# Patient Record
Sex: MALE | Race: WHITE | NOT HISPANIC OR LATINO
[De-identification: names, ages, dates, MRNs, and addresses within clinical notes are randomized per-mention and may not be internally consistent; named-entity substitution may affect disease eponyms.]

---

## 2020-06-28 ENCOUNTER — TRANSCRIPTION ENCOUNTER (OUTPATIENT)
Age: 55
End: 2020-06-28

## 2021-10-27 DIAGNOSIS — Z83.71 FAMILY HISTORY OF COLONIC POLYPS: ICD-10-CM

## 2021-10-27 DIAGNOSIS — Z82.49 FAMILY HISTORY OF ISCHEMIC HEART DISEASE AND OTHER DISEASES OF THE CIRCULATORY SYSTEM: ICD-10-CM

## 2021-10-27 DIAGNOSIS — Z87.01 PERSONAL HISTORY OF PNEUMONIA (RECURRENT): ICD-10-CM

## 2021-10-27 DIAGNOSIS — K21.00 GASTRO-ESOPHAGEAL REFLUX DISEASE WITH ESOPHAGITIS, WITHOUT BLEEDING: ICD-10-CM

## 2021-10-27 DIAGNOSIS — Z82.69 FAMILY HISTORY OF OTHER DISEASES OF THE MUSCULOSKELETAL SYSTEM AND CONNECTIVE TISSUE: ICD-10-CM

## 2021-10-27 DIAGNOSIS — Z82.5 FAMILY HISTORY OF ASTHMA AND OTHER CHRONIC LOWER RESPIRATORY DISEASES: ICD-10-CM

## 2021-10-27 DIAGNOSIS — Z87.09 PERSONAL HISTORY OF OTHER DISEASES OF THE RESPIRATORY SYSTEM: ICD-10-CM

## 2021-10-27 DIAGNOSIS — Z80.6 FAMILY HISTORY OF LEUKEMIA: ICD-10-CM

## 2021-10-27 DIAGNOSIS — Z86.79 PERSONAL HISTORY OF OTHER DISEASES OF THE CIRCULATORY SYSTEM: ICD-10-CM

## 2021-10-27 DIAGNOSIS — Z72.89 OTHER PROBLEMS RELATED TO LIFESTYLE: ICD-10-CM

## 2021-10-27 DIAGNOSIS — Z80.7 FAMILY HISTORY OF OTHER MALIGNANT NEOPLASMS OF LYMPHOID, HEMATOPOIETIC AND RELATED TISSUES: ICD-10-CM

## 2021-10-27 PROBLEM — Z00.00 ENCOUNTER FOR PREVENTIVE HEALTH EXAMINATION: Status: ACTIVE | Noted: 2021-10-27

## 2021-12-07 ENCOUNTER — APPOINTMENT (OUTPATIENT)
Dept: INTERNAL MEDICINE | Facility: CLINIC | Age: 56
End: 2021-12-07
Payer: COMMERCIAL

## 2021-12-07 ENCOUNTER — TRANSCRIPTION ENCOUNTER (OUTPATIENT)
Age: 56
End: 2021-12-07

## 2021-12-07 ENCOUNTER — NON-APPOINTMENT (OUTPATIENT)
Age: 56
End: 2021-12-07

## 2021-12-07 VITALS
DIASTOLIC BLOOD PRESSURE: 74 MMHG | SYSTOLIC BLOOD PRESSURE: 118 MMHG | BODY MASS INDEX: 29.03 KG/M2 | TEMPERATURE: 98.6 F | HEIGHT: 69 IN | HEART RATE: 67 BPM | RESPIRATION RATE: 16 BRPM | OXYGEN SATURATION: 95 % | WEIGHT: 196 LBS

## 2021-12-07 LAB
BILIRUB UR QL STRIP: NEGATIVE
CLARITY UR: CLEAR
COLLECTION METHOD: NORMAL
GLUCOSE UR-MCNC: NEGATIVE
HCG UR QL: 0.2 EU/DL
HGB UR QL STRIP.AUTO: NEGATIVE
KETONES UR-MCNC: NEGATIVE
LEUKOCYTE ESTERASE UR QL STRIP: NEGATIVE
NITRITE UR QL STRIP: NEGATIVE
PH UR STRIP: 5.5
PROT UR STRIP-MCNC: NEGATIVE
SP GR UR STRIP: 1.03

## 2021-12-07 PROCEDURE — 36415 COLL VENOUS BLD VENIPUNCTURE: CPT

## 2021-12-07 PROCEDURE — G0444 DEPRESSION SCREEN ANNUAL: CPT | Mod: 59

## 2021-12-07 PROCEDURE — 93000 ELECTROCARDIOGRAM COMPLETE: CPT | Mod: 59

## 2021-12-07 PROCEDURE — G0442 ANNUAL ALCOHOL SCREEN 15 MIN: CPT

## 2021-12-07 PROCEDURE — 99396 PREV VISIT EST AGE 40-64: CPT | Mod: 25

## 2021-12-07 PROCEDURE — 81003 URINALYSIS AUTO W/O SCOPE: CPT | Mod: NC,QW

## 2021-12-07 RX ORDER — PANTOPRAZOLE 40 MG/1
40 TABLET, DELAYED RELEASE ORAL DAILY
Refills: 0 | Status: DISCONTINUED | COMMUNITY
End: 2021-12-07

## 2021-12-07 RX ORDER — ASPIRIN 81 MG/1
81 TABLET, CHEWABLE ORAL DAILY
Refills: 0 | Status: DISCONTINUED | COMMUNITY
End: 2021-12-07

## 2021-12-07 NOTE — HISTORY OF PRESENT ILLNESS
[de-identified] : Had RF ablation to the veins on the right thigh. 12/15/2020.\par No other complaints and no changes in functional ability.\par

## 2021-12-07 NOTE — HEALTH RISK ASSESSMENT
[Yes] : Yes [2 - 3 times a week (3 pts)] : 2 - 3  times a week (3 points) [1 or 2 (0 pts)] : 1 or 2 (0 points) [Never (0 pts)] : Never (0 points) [No] : In the past 12 months have you used drugs other than those required for medical reasons? No [No falls in past year] : Patient reported no falls in the past year [0] : 2) Feeling down, depressed, or hopeless: Not at all (0) [PHQ-2 Negative - No further assessment needed] : PHQ-2 Negative - No further assessment needed [Fully functional (bathing, dressing, toileting, transferring, walking, feeding)] : Fully functional (bathing, dressing, toileting, transferring, walking, feeding) [Fully functional (using the telephone, shopping, preparing meals, housekeeping, doing laundry, using] : Fully functional and needs no help or supervision to perform IADLs (using the telephone, shopping, preparing meals, housekeeping, doing laundry, using transportation, managing medications and managing finances) [I will adhere to the patient's wishes.] : I will adhere to the patient's wishes. [Time Spent: ___ minutes] : Time Spent: [unfilled] minutes [] : No [[id number]] : 0 [AdvancecareDate] : 12/7/21 [FreeTextEntry4] : Discussed with patient.  Suggested the discussion with the healthcare proxy about quality of life issues.  Suggested a discussion on withholding treatment and no further therapy in any given circumstance as determined by the patient.\par

## 2021-12-07 NOTE — PHYSICAL EXAM
[Normal] : normal gait, coordination grossly intact, no focal deficits [FreeTextEntry1] : not enlarged, no nodules.

## 2021-12-08 ENCOUNTER — TRANSCRIPTION ENCOUNTER (OUTPATIENT)
Age: 56
End: 2021-12-08

## 2021-12-08 LAB
ALBUMIN SERPL ELPH-MCNC: 4.5 G/DL
ALP BLD-CCNC: 77 U/L
ALT SERPL-CCNC: 21 U/L
ANION GAP SERPL CALC-SCNC: 12 MMOL/L
AST SERPL-CCNC: 20 U/L
BASOPHILS # BLD AUTO: 0.05 K/UL
BASOPHILS NFR BLD AUTO: 0.9 %
BILIRUB SERPL-MCNC: 0.4 MG/DL
BUN SERPL-MCNC: 22 MG/DL
CALCIUM SERPL-MCNC: 9.4 MG/DL
CHLORIDE SERPL-SCNC: 104 MMOL/L
CHOLEST SERPL-MCNC: 200 MG/DL
CO2 SERPL-SCNC: 26 MMOL/L
CREAT SERPL-MCNC: 0.96 MG/DL
EOSINOPHIL # BLD AUTO: 0.21 K/UL
EOSINOPHIL NFR BLD AUTO: 3.8 %
ESTIMATED AVERAGE GLUCOSE: 120 MG/DL
GLUCOSE SERPL-MCNC: 92 MG/DL
HBA1C MFR BLD HPLC: 5.8 %
HCT VFR BLD CALC: 43.3 %
HDLC SERPL-MCNC: 43 MG/DL
HGB BLD-MCNC: 14.6 G/DL
IMM GRANULOCYTES NFR BLD AUTO: 0.2 %
LDLC SERPL CALC-MCNC: 112 MG/DL
LYMPHOCYTES # BLD AUTO: 1.61 K/UL
LYMPHOCYTES NFR BLD AUTO: 29 %
MAN DIFF?: NORMAL
MCHC RBC-ENTMCNC: 28.1 PG
MCHC RBC-ENTMCNC: 33.7 GM/DL
MCV RBC AUTO: 83.3 FL
MONOCYTES # BLD AUTO: 0.34 K/UL
MONOCYTES NFR BLD AUTO: 6.1 %
NEUTROPHILS # BLD AUTO: 3.33 K/UL
NEUTROPHILS NFR BLD AUTO: 60 %
NONHDLC SERPL-MCNC: 157 MG/DL
PLATELET # BLD AUTO: 165 K/UL
POTASSIUM SERPL-SCNC: 4.3 MMOL/L
PROT SERPL-MCNC: 6.9 G/DL
PSA SERPL-MCNC: 0.91 NG/ML
RBC # BLD: 5.2 M/UL
RBC # FLD: 12.2 %
SODIUM SERPL-SCNC: 142 MMOL/L
TRIGL SERPL-MCNC: 223 MG/DL
TSH SERPL-ACNC: 3.59 UIU/ML
WBC # FLD AUTO: 5.55 K/UL

## 2022-02-07 ENCOUNTER — TRANSCRIPTION ENCOUNTER (OUTPATIENT)
Age: 57
End: 2022-02-07

## 2022-05-11 ENCOUNTER — TRANSCRIPTION ENCOUNTER (OUTPATIENT)
Age: 57
End: 2022-05-11

## 2023-03-06 ENCOUNTER — RX RENEWAL (OUTPATIENT)
Age: 58
End: 2023-03-06

## 2023-03-16 DIAGNOSIS — Z01.89 ENCOUNTER FOR OTHER SPECIFIED SPECIAL EXAMINATIONS: ICD-10-CM

## 2023-03-16 DIAGNOSIS — B35.1 TINEA UNGUIUM: ICD-10-CM

## 2023-03-16 DIAGNOSIS — I83.93 ASYMPTOMATIC VARICOSE VEINS OF BILATERAL LOWER EXTREMITIES: ICD-10-CM

## 2023-03-16 DIAGNOSIS — Z82.49 FAMILY HISTORY OF ISCHEMIC HEART DISEASE AND OTHER DISEASES OF THE CIRCULATORY SYSTEM: ICD-10-CM

## 2023-03-16 DIAGNOSIS — J45.990 EXERCISE INDUCED BRONCHOSPASM: ICD-10-CM

## 2023-03-17 ENCOUNTER — APPOINTMENT (OUTPATIENT)
Dept: INTERNAL MEDICINE | Facility: CLINIC | Age: 58
End: 2023-03-17

## 2023-03-21 ENCOUNTER — NON-APPOINTMENT (OUTPATIENT)
Age: 58
End: 2023-03-21

## 2023-03-23 ENCOUNTER — APPOINTMENT (OUTPATIENT)
Dept: INTERNAL MEDICINE | Facility: CLINIC | Age: 58
End: 2023-03-23
Payer: COMMERCIAL

## 2023-03-23 ENCOUNTER — NON-APPOINTMENT (OUTPATIENT)
Age: 58
End: 2023-03-23

## 2023-03-23 VITALS
HEART RATE: 74 BPM | TEMPERATURE: 98.2 F | SYSTOLIC BLOOD PRESSURE: 124 MMHG | DIASTOLIC BLOOD PRESSURE: 78 MMHG | WEIGHT: 194 LBS | RESPIRATION RATE: 16 BRPM | BODY MASS INDEX: 28.73 KG/M2 | HEIGHT: 69 IN | OXYGEN SATURATION: 96 %

## 2023-03-23 DIAGNOSIS — J45.909 UNSPECIFIED ASTHMA, UNCOMPLICATED: ICD-10-CM

## 2023-03-23 DIAGNOSIS — M10.9 GOUT, UNSPECIFIED: ICD-10-CM

## 2023-03-23 PROCEDURE — 36415 COLL VENOUS BLD VENIPUNCTURE: CPT

## 2023-03-23 PROCEDURE — 99396 PREV VISIT EST AGE 40-64: CPT | Mod: 25

## 2023-03-23 PROCEDURE — 90750 HZV VACC RECOMBINANT IM: CPT

## 2023-03-23 PROCEDURE — 99497 ADVNCD CARE PLAN 30 MIN: CPT | Mod: 25

## 2023-03-23 PROCEDURE — 93000 ELECTROCARDIOGRAM COMPLETE: CPT | Mod: 59

## 2023-03-23 PROCEDURE — 90471 IMMUNIZATION ADMIN: CPT

## 2023-03-23 NOTE — HISTORY OF PRESENT ILLNESS
[de-identified] : Patient comes for a physical with no overall change in functional or exertional ability.  No acute complaints.\par Taking medications for gout with no recent gout attacks.\par Not actively exercising so has not had to use the inhaler.

## 2023-03-23 NOTE — HEALTH RISK ASSESSMENT
[2 - 3 times a week (3 pts)] : 2 - 3  times a week (3 points) [1 or 2 (0 pts)] : 1 or 2 (0 points) [No] : In the past 12 months have you used drugs other than those required for medical reasons? No [0] : 2) Feeling down, depressed, or hopeless: Not at all (0) [PHQ-2 Negative - No further assessment needed] : PHQ-2 Negative - No further assessment needed [Never] : Never [Yes] : Yes [Never (0 pts)] : Never (0 points) [No falls in past year] : Patient reported no falls in the past year [HIV test declined] : HIV test declined [Hepatitis C test declined] : Hepatitis C test declined [Fully functional (bathing, dressing, toileting, transferring, walking, feeding)] : Fully functional (bathing, dressing, toileting, transferring, walking, feeding) [Fully functional (using the telephone, shopping, preparing meals, housekeeping, doing laundry, using] : Fully functional and needs no help or supervision to perform IADLs (using the telephone, shopping, preparing meals, housekeeping, doing laundry, using transportation, managing medications and managing finances) [Reviewed no changes] : Reviewed, no changes [Designated Healthcare Proxy] : Designated healthcare proxy [Name: ___] : Health Care Proxy's Name: [unfilled]  [Relationship: ___] : Relationship: [unfilled] [I will adhere to the patient's wishes.] : I will adhere to the patient's wishes. [Time Spent: ___ minutes] : Time Spent: [unfilled] minutes [Audit-CScore] : 3 [[id number]] : 0 [AdvancecareDate] : 3/23/23 [FreeTextEntry4] : Discussed with patient.  Suggested the discussion with the healthcare proxy about quality of life issues.  Suggested a discussion on withholding treatment and no further therapy in any given circumstance as determined by the patient.\par

## 2023-03-24 LAB
ALBUMIN SERPL ELPH-MCNC: 4.6 G/DL
ALP BLD-CCNC: 80 U/L
ALT SERPL-CCNC: 29 U/L
ANION GAP SERPL CALC-SCNC: 14 MMOL/L
AST SERPL-CCNC: 22 U/L
BASOPHILS # BLD AUTO: 0.06 K/UL
BASOPHILS NFR BLD AUTO: 1.2 %
BILIRUB SERPL-MCNC: 1.1 MG/DL
BUN SERPL-MCNC: 24 MG/DL
CALCIUM SERPL-MCNC: 9.9 MG/DL
CHLORIDE SERPL-SCNC: 102 MMOL/L
CHOLEST SERPL-MCNC: 223 MG/DL
CO2 SERPL-SCNC: 25 MMOL/L
CREAT SERPL-MCNC: 0.95 MG/DL
EGFR: 94 ML/MIN/1.73M2
EOSINOPHIL # BLD AUTO: 0.18 K/UL
EOSINOPHIL NFR BLD AUTO: 3.6 %
GLUCOSE SERPL-MCNC: 103 MG/DL
HCT VFR BLD CALC: 45.2 %
HDLC SERPL-MCNC: 48 MG/DL
HGB BLD-MCNC: 15.1 G/DL
IMM GRANULOCYTES NFR BLD AUTO: 0.2 %
LDLC SERPL CALC-MCNC: 140 MG/DL
LYMPHOCYTES # BLD AUTO: 1.81 K/UL
LYMPHOCYTES NFR BLD AUTO: 36.1 %
MAN DIFF?: NORMAL
MCHC RBC-ENTMCNC: 27.8 PG
MCHC RBC-ENTMCNC: 33.4 GM/DL
MCV RBC AUTO: 83.1 FL
MONOCYTES # BLD AUTO: 0.37 K/UL
MONOCYTES NFR BLD AUTO: 7.4 %
NEUTROPHILS # BLD AUTO: 2.59 K/UL
NEUTROPHILS NFR BLD AUTO: 51.5 %
NONHDLC SERPL-MCNC: 175 MG/DL
PLATELET # BLD AUTO: 177 K/UL
POTASSIUM SERPL-SCNC: 4.5 MMOL/L
PROT SERPL-MCNC: 7 G/DL
PSA SERPL-MCNC: 0.67 NG/ML
RBC # BLD: 5.44 M/UL
RBC # FLD: 13.1 %
SODIUM SERPL-SCNC: 142 MMOL/L
TRIGL SERPL-MCNC: 174 MG/DL
TSH SERPL-ACNC: 2.79 UIU/ML
URATE SERPL-MCNC: 6 MG/DL
WBC # FLD AUTO: 5.02 K/UL

## 2023-05-23 ENCOUNTER — APPOINTMENT (OUTPATIENT)
Dept: INTERNAL MEDICINE | Facility: CLINIC | Age: 58
End: 2023-05-23
Payer: COMMERCIAL

## 2023-05-23 ENCOUNTER — NON-APPOINTMENT (OUTPATIENT)
Age: 58
End: 2023-05-23

## 2023-05-23 PROCEDURE — 90471 IMMUNIZATION ADMIN: CPT

## 2023-05-23 PROCEDURE — 90750 HZV VACC RECOMBINANT IM: CPT

## 2023-05-23 RX ORDER — ATORVASTATIN CALCIUM 10 MG/1
10 TABLET, FILM COATED ORAL DAILY
Qty: 90 | Refills: 0 | Status: ACTIVE | COMMUNITY
Start: 2023-05-23 | End: 1900-01-01

## 2023-06-05 ENCOUNTER — RX RENEWAL (OUTPATIENT)
Age: 58
End: 2023-06-05

## 2023-06-08 ENCOUNTER — APPOINTMENT (OUTPATIENT)
Dept: VASCULAR SURGERY | Facility: CLINIC | Age: 58
End: 2023-06-08
Payer: COMMERCIAL

## 2023-06-08 VITALS
RESPIRATION RATE: 16 BRPM | DIASTOLIC BLOOD PRESSURE: 82 MMHG | WEIGHT: 191.38 LBS | BODY MASS INDEX: 28.35 KG/M2 | SYSTOLIC BLOOD PRESSURE: 124 MMHG | OXYGEN SATURATION: 99 % | TEMPERATURE: 98 F | HEART RATE: 63 BPM | HEIGHT: 69 IN

## 2023-06-08 PROCEDURE — 99204 OFFICE O/P NEW MOD 45 MIN: CPT

## 2023-06-09 NOTE — ASSESSMENT
[FreeTextEntry1] : 58-year-old male with right lower extremity symptomatic varicose veins and edema. \par \par  Patient underwent endovenous ablation of right greater saphenous vein.  He was advised at that during the time of his intervention stab phlebectomy was supposed to be performed however patient had a vasovagal episode and procedure was aborted.\par \par We discussed potential etiologies as well as treatment options such as endovenous ablation, Varithena, and stab phlebectomy.  Patient understands the risks of intervention, including but not limited to bleeding, infection, permanent nerve damage, distal embolization, EHIT, and skin discoloration. \par Patient will attempt a course of non-operative management with leg compression and elevation.  \par Skin care with moisturizers. \par Follow up in the office in 4-6 weeks or sooner as needed. \par \par 46 min \par

## 2023-06-09 NOTE — HISTORY OF PRESENT ILLNESS
[FreeTextEntry1] : 59 yo M with PNHx of R GSV ablation for symptomatic VVs and reflux at the OSH presented for an evaluation of right  lower extremity edema symptomatic varicose veins.  The symptoms are  chronic, present for years. Progressively worsened over time. Patient complains of tenderness, heaviness, burning and significant discomfort in legs.  The symptoms are least pronounced in the morning and worsens during the course of the day. Alleviated by leg elevation. Exacerbated by prolonged standing, activity and warm weather. Lifestyle limiting. \par Patient attempted compressive therapy in the past with only a marginal improvement of the symptoms since prior to closure of the GSV. \par \par No history of cellulitis, ulcers. \par No erythema. \par \par 12 system ROS is as per HPI, otherwise unremarcable. \par \par PHYSICAL EXAM: \par General: NAD, awake alert and oriented.\par HEENT: Normocephalic atraumatic.  Extraocular muscles intact.\par Neck: No JVD.  Normal thyroid.\par Abdomen: Soft, NT, ND. No guarding. No palpable masses. No HSM.\par : No CVA tenderness.\par Vascular: 2+ bilateral carotid, radial, femoral, popliteal, DP and PT pulses.\par Bilateral lower extremity 2+ edema.\par Varicose veins greater than 3 mm in diameter present in right lower extremity.  Most pronounced on the medial aspect of the calf.  Tender to touch.\par Extremity: Bilateral lower extremity warm and dry.  No cyanosis.  + edema.\par Integument: No rashes or lesions\par MS: Normal muscle bulk and tone. FROM in all extremities.\par Neuro: AAOx3, CN II-XII intact. Strength 5/5 in all 4 extremities. Sensation intact throughout.\par Psych: Calm.  Appropriate affect.\par

## 2023-06-28 ENCOUNTER — APPOINTMENT (OUTPATIENT)
Dept: INTERNAL MEDICINE | Facility: CLINIC | Age: 58
End: 2023-06-28

## 2023-07-13 ENCOUNTER — APPOINTMENT (OUTPATIENT)
Dept: VASCULAR SURGERY | Facility: CLINIC | Age: 58
End: 2023-07-13
Payer: COMMERCIAL

## 2023-07-13 PROCEDURE — 99213 OFFICE O/P EST LOW 20 MIN: CPT

## 2023-07-13 NOTE — DATA REVIEWED
[FreeTextEntry1] : Date: 23\par Patient Info\par Name:     ARABELLA VALENCIA\par Age:     58 years\par :     1965\par Gender:     Male\par MRN:     [id number]\par Accession #:     [id number]\par Ht:     69 in\par Wt:     193 lb\par BSA:     2.08 m2\par Technical Quality:     Diagnostic quality\par Exam Date:     2023 11:22 AM\par Exam Location:     [facility]\par Patient Status:     Outpatient\par \par Staff\par Ordering Physician:     Misty Mendoza MD\par Sonographer:     Yumiko Tran RVT\par \par Exam Type:     CV VENOUS LOW UNI REFLUX\par \par Indications\par      - R60.0\par \par A non-invasive vascular duplex imaging study of the deep and superficial veins\par of the right lower extremity(s) was performed using two-dimensional\par evaluation, color flow and spectral Doppler analysis.\par   An evaluation for venous insufficiency was also completed.\par \par Account #:     [id number]\par \par Medications:     No meds\par \par \par Swelling Location:     right\par \par Swelling:     Yes\par \par Prior Ablation:     Yes\par \par Ablation Date:     2020\par \par \par Summary\par   1. ACUTE superficial venous thrombosis in RIGHT calf varicosities without\par extension into the great saphenous vein.\par   2. No evidence of deep vein thrombosis right lower extremity.\par   3. Right great saphenous vein atrophied proximal thigh to proximal calf\par status post ablation.\par   4. Severe venous insufficiency in the right small saphenous vein.\par \par Results Notification\par   Verbal prelim to Dr. Beasley @ 12:11pm.\par \par \par Right Findings\par   ACUTE superficial venous thrombosis in RIGHT calf varicosities without\par extension into the great saphenous vein.\par   No evidence of deep vein thrombosis right lower extremity.\par   The deep veins of the right lower extremity are readily compressible with\par normal venous flow dynamics including spontaneous flow, respiratory phasic\par variation and augmentation.\par   Right great saphenous vein atrophied proximal thigh to proximal calf status\par post ablation.\par   No evidence of venous insufficiency in the right deep veins or calf great\par saphenous vein.\par   Severe venous insufficiency in the right small saphenous vein.\par \par \par B--Mode Measurements\par ----------------------------------------------------------------------\par Name                                 Value              \par ----------------------------------------------------------------------\par \par Right Superficial Venous System\par ----------------------------------------------------------------------\par Right Mid SSV Reflux Time          5.7 sec\par \par ----------------------------------------------------------------------\par Name                                 Value              \par ----------------------------------------------------------------------\par \par Right\par ----------------------------------------------------------------------\par Right Mid GSV (Calf) AP Diam       0.31 cm               \par Right Prox SSV AP Diam             0.43 cm               \par Right Mid SSV AP Diam              0.27 cm\par \par \par Report Signatures\par Finalized by Dr. Med Estrella MD, [facility], [facility] on 2023 10:14 PM

## 2023-07-13 NOTE — PHYSICAL EXAM
[Normal Breath Sounds] : Normal breath sounds [2+] : left 2+ [Varicose Veins Of Lower Extremities] : present [Varicose Veins Of The Right Leg] : of the right leg [Alert] : alert [Oriented to Person] : oriented to person [Oriented to Place] : oriented to place [Calm] : calm [de-identified] : NAD [de-identified] : NCAT [de-identified] : supple [de-identified] : Soft, nontender, nondistended [de-identified] : Left medial calf with varicose veins, small focus which are noncompressible and mildly tender, and adjacent varicose veins are compressible and soft

## 2023-07-13 NOTE — HISTORY OF PRESENT ILLNESS
[FreeTextEntry1] : 57 yo M with PNHx of R GSV ablation for symptomatic VVs and reflux at the OSH presented for an evaluation of right  lower extremity edema symptomatic varicose veins.  The symptoms are  chronic, present for years. Progressively worsened over time. Patient complains of tenderness, heaviness, burning and significant discomfort in legs.  The symptoms are least pronounced in the morning and worsens during the course of the day. Alleviated by leg elevation. Exacerbated by prolonged standing, activity and warm weather. Lifestyle limiting. \par Patient attempted compressive therapy in the past with only a marginal improvement of the symptoms since prior to closure of the GSV. \par \par No history of cellulitis, ulcers. \par No erythema. \par \par 12 system ROS is as per HPI, otherwise unremarcable. \par \par PHYSICAL EXAM: \par General: NAD, awake alert and oriented.\par HEENT: Normocephalic atraumatic.  Extraocular muscles intact.\par Neck: No JVD.  Normal thyroid.\par Abdomen: Soft, NT, ND. No guarding. No palpable masses. No HSM.\par : No CVA tenderness.\par Vascular: 2+ bilateral carotid, radial, femoral, popliteal, DP and PT pulses.\par Bilateral lower extremity 2+ edema.\par Varicose veins greater than 3 mm in diameter present in right lower extremity.  Most pronounced on the medial aspect of the calf.  Tender to touch.\par Extremity: Bilateral lower extremity warm and dry.  No cyanosis.  + edema.\par Integument: No rashes or lesions\par MS: Normal muscle bulk and tone. FROM in all extremities.\par Neuro: AAOx3, CN II-XII intact. Strength 5/5 in all 4 extremities. Sensation intact throughout.\par Psych: Calm.  Appropriate affect.\par  [de-identified] : 7/13/23 - He returns for follow-up as he was undergoing right leg venous ultrasound at the [place] [facility] lab, and is found to have thrombophlebitis of his varicose veins.  He reports that he developed some pain and discomfort overlying the varicose veins as well as some hardness in the area.  He denies leg swelling or other newfound discomfort.

## 2023-07-13 NOTE — ASSESSMENT
[FreeTextEntry1] : 58-year-old male who has been evaluated by my partner Dr. Mendoza for recurrent right leg symptomatic varicosities, on a background of right leg ablation in the past.  Due to his recurrent symptoms, he was undergoing venous duplex at the [place] [facility] lab which revealed SSV reflux as well as superficial thrombophlebitis of his calf varicosities, there is no axial superficial or deep vein involvement, and as such no anticoagulation is indicated at this time..  I have advised him to utilize warm compresses over the affected area as well as anti-inflammatories for the next 1 to 2 weeks, I have also advised use of compression garments on a daily basis.  He will follow-up with Dr. Mendoza in 3 to 4 weeks to ensure appropriate resolution of his SVT, and then proceed with the appropriate planned venous interventions.

## 2023-08-01 ENCOUNTER — APPOINTMENT (OUTPATIENT)
Dept: VASCULAR SURGERY | Facility: CLINIC | Age: 58
End: 2023-08-01
Payer: COMMERCIAL

## 2023-08-01 VITALS — DIASTOLIC BLOOD PRESSURE: 90 MMHG | SYSTOLIC BLOOD PRESSURE: 137 MMHG | HEART RATE: 66 BPM

## 2023-08-01 DIAGNOSIS — R60.0 LOCALIZED EDEMA: ICD-10-CM

## 2023-08-01 DIAGNOSIS — I80.01 PHLEBITIS AND THROMBOPHLEBITIS OF SUPERFICIAL VESSELS OF RIGHT LOWER EXTREMITY: ICD-10-CM

## 2023-08-01 DIAGNOSIS — I83.899 VARICOSE VEINS OF UNSPECIFIED LOWER EXTREMITY WITH OTHER COMPLICATIONS: ICD-10-CM

## 2023-08-01 PROCEDURE — 99213 OFFICE O/P EST LOW 20 MIN: CPT

## 2023-08-01 NOTE — DATA REVIEWED
[FreeTextEntry1] : venous US reviewed. SSV reflux and large medial calf varicose veins with thrombophlebitis.

## 2023-08-01 NOTE — HISTORY OF PRESENT ILLNESS
[FreeTextEntry1] : 57 yo M with PNHx of R GSV ablation for symptomatic VVs and reflux at the OSH presented for an evaluation of right  lower extremity edema symptomatic varicose veins.  The symptoms are  chronic, present for years. Progressively worsened over time. Patient complains of tenderness, heaviness, burning and significant discomfort in legs.  The symptoms are least pronounced in the morning and worsens during the course of the day. Alleviated by leg elevation. Exacerbated by prolonged standing, activity and warm weather. Lifestyle limiting.  Patient attempted compressive therapy in the past with only a marginal improvement of the symptoms since prior to closure of the GSV.   No history of cellulitis, ulcers.  No erythema.      [de-identified] : 58-year-old male returns for follow-up. The patient was found to have thrombophlebitis of his varicose veins during his last US at the [place] [facility].  He continues have tenderness and discomfort overlying the varicose veins, especially when he is sitting for a prolonged period of time. He reports occasional right lower extremity swelling associated with the veins.  Review of systems: 12 system ROS is negative except for as per HPI.

## 2023-08-01 NOTE — ASSESSMENT
[FreeTextEntry1] : 58-year-old male with recurrent right leg symptomatic varicosities. His last US demonstrated SSV reflux as well as superficial thrombophlebitis of his calf varicosities. He continues to have tenderness and discomfort despite daily compression and using anti-inflammatories. The patient is an excellent candidate for an ablation of the right SSV followed by a stab phlebectomy in a staged fashion. The risks and benefits of both procedures including infection, bleeding, and DVT were discussed in detail with the patient.   The patient would like to proceed with the ablation and possible stab phlebectopmy and will be scheduled for the procedure at his convenience.  [Arterial/Venous Disease] : arterial/venous disease

## 2024-03-26 ENCOUNTER — APPOINTMENT (OUTPATIENT)
Dept: INTERNAL MEDICINE | Facility: CLINIC | Age: 59
End: 2024-03-26

## 2024-05-14 ENCOUNTER — NON-APPOINTMENT (OUTPATIENT)
Age: 59
End: 2024-05-14

## 2024-05-15 ENCOUNTER — NON-APPOINTMENT (OUTPATIENT)
Age: 59
End: 2024-05-15

## 2024-05-15 ENCOUNTER — APPOINTMENT (OUTPATIENT)
Dept: INTERNAL MEDICINE | Facility: CLINIC | Age: 59
End: 2024-05-15
Payer: COMMERCIAL

## 2024-05-15 VITALS
BODY MASS INDEX: 28.14 KG/M2 | TEMPERATURE: 98.4 F | OXYGEN SATURATION: 97 % | SYSTOLIC BLOOD PRESSURE: 142 MMHG | DIASTOLIC BLOOD PRESSURE: 82 MMHG | WEIGHT: 190 LBS | HEIGHT: 69 IN | HEART RATE: 67 BPM | RESPIRATION RATE: 16 BRPM

## 2024-05-15 DIAGNOSIS — E78.5 HYPERLIPIDEMIA, UNSPECIFIED: ICD-10-CM

## 2024-05-15 DIAGNOSIS — Z00.00 ENCOUNTER FOR GENERAL ADULT MEDICAL EXAMINATION W/OUT ABNORMAL FINDINGS: ICD-10-CM

## 2024-05-15 DIAGNOSIS — R07.9 CHEST PAIN, UNSPECIFIED: ICD-10-CM

## 2024-05-15 PROCEDURE — 93000 ELECTROCARDIOGRAM COMPLETE: CPT

## 2024-05-15 PROCEDURE — 99213 OFFICE O/P EST LOW 20 MIN: CPT | Mod: 25

## 2024-05-15 PROCEDURE — 99396 PREV VISIT EST AGE 40-64: CPT

## 2024-05-15 PROCEDURE — 36415 COLL VENOUS BLD VENIPUNCTURE: CPT

## 2024-05-15 NOTE — HISTORY OF PRESENT ILLNESS
[de-identified] : Patient comes to the office for a physical.  For about 4 weeks patient has been having anterior chest discomfort that may be persistent with times when it increases.  No specific etiology of what makes the discomfort increased.  No changes in exertional or functional ability.  No nausea vomiting shortness of breath or difficulty breathing.  No radiation of the pain.  No palpitations except for about a week and a half ago when he had a transient brief flutter. Never started cholesterol medicines.  Continues to take medication for prevention of gout.  No recent flareups.

## 2024-05-15 NOTE — HEALTH RISK ASSESSMENT
[Good] : ~his/her~  mood as  good [Yes] : Yes [2 - 3 times a week (3 pts)] : 2 - 3  times a week (3 points) [1 or 2 (0 pts)] : 1 or 2 (0 points) [Never (0 pts)] : Never (0 points) [No] : In the past 12 months have you used drugs other than those required for medical reasons? No [Little interest or pleasure doing things] : 1) Little interest or pleasure doing things [Feeling down, depressed, or hopeless] : 2) Feeling down, depressed, or hopeless [0] : 2) Feeling down, depressed, or hopeless: Not at all (0) [PHQ-2 Negative - No further assessment needed] : PHQ-2 Negative - No further assessment needed [Never] : Never [Audit-CScore] : 3 [[id number]] : 0

## 2024-05-17 LAB
ALBUMIN SERPL ELPH-MCNC: 4.7 G/DL
ALP BLD-CCNC: 72 U/L
ALT SERPL-CCNC: 23 U/L
ANION GAP SERPL CALC-SCNC: 15 MMOL/L
AST SERPL-CCNC: 21 U/L
BASOPHILS # BLD AUTO: 0.07 K/UL
BASOPHILS NFR BLD AUTO: 1.1 %
BILIRUB SERPL-MCNC: 1.3 MG/DL
BUN SERPL-MCNC: 19 MG/DL
CALCIUM SERPL-MCNC: 10.1 MG/DL
CHLORIDE SERPL-SCNC: 100 MMOL/L
CHOLEST SERPL-MCNC: 223 MG/DL
CO2 SERPL-SCNC: 26 MMOL/L
CREAT SERPL-MCNC: 0.91 MG/DL
EGFR: 98 ML/MIN/1.73M2
EOSINOPHIL # BLD AUTO: 0.17 K/UL
EOSINOPHIL NFR BLD AUTO: 2.6 %
ESTIMATED AVERAGE GLUCOSE: 126 MG/DL
GLUCOSE SERPL-MCNC: 90 MG/DL
HBA1C MFR BLD HPLC: 6 %
HCT VFR BLD CALC: 44.4 %
HDLC SERPL-MCNC: 52 MG/DL
HGB BLD-MCNC: 15.2 G/DL
IMM GRANULOCYTES NFR BLD AUTO: 0.3 %
LDLC SERPL CALC-MCNC: 143 MG/DL
LYMPHOCYTES # BLD AUTO: 1.77 K/UL
LYMPHOCYTES NFR BLD AUTO: 27.3 %
MAN DIFF?: NORMAL
MCHC RBC-ENTMCNC: 28.2 PG
MCHC RBC-ENTMCNC: 34.2 GM/DL
MCV RBC AUTO: 82.4 FL
MONOCYTES # BLD AUTO: 0.35 K/UL
MONOCYTES NFR BLD AUTO: 5.4 %
NEUTROPHILS # BLD AUTO: 4.1 K/UL
NEUTROPHILS NFR BLD AUTO: 63.3 %
NONHDLC SERPL-MCNC: 171 MG/DL
PLATELET # BLD AUTO: 175 K/UL
POTASSIUM SERPL-SCNC: 4.6 MMOL/L
PROT SERPL-MCNC: 7.5 G/DL
PSA SERPL-MCNC: 0.6 NG/ML
RBC # BLD: 5.39 M/UL
RBC # FLD: 13.2 %
SODIUM SERPL-SCNC: 141 MMOL/L
TRIGL SERPL-MCNC: 155 MG/DL
TSH SERPL-ACNC: 3.04 UIU/ML
WBC # FLD AUTO: 6.48 K/UL

## 2024-05-22 ENCOUNTER — RX RENEWAL (OUTPATIENT)
Age: 59
End: 2024-05-22

## 2024-05-22 RX ORDER — ALBUTEROL SULFATE 90 UG/1
108 (90 BASE) INHALANT RESPIRATORY (INHALATION)
Qty: 1 | Refills: 2 | Status: ACTIVE | COMMUNITY
Start: 1900-01-01 | End: 1900-01-01

## 2024-06-06 ENCOUNTER — RX RENEWAL (OUTPATIENT)
Age: 59
End: 2024-06-06

## 2024-06-06 RX ORDER — ALLOPURINOL 300 MG/1
300 TABLET ORAL DAILY
Qty: 90 | Refills: 3 | Status: ACTIVE | COMMUNITY
Start: 2023-03-06 | End: 1900-01-01

## 2024-12-25 PROBLEM — F10.90 ALCOHOL USE: Status: ACTIVE | Noted: 2021-10-27

## 2025-02-12 ENCOUNTER — APPOINTMENT (OUTPATIENT)
Dept: INTERNAL MEDICINE | Facility: CLINIC | Age: 60
End: 2025-02-12
Payer: COMMERCIAL

## 2025-02-12 VITALS
HEART RATE: 61 BPM | HEIGHT: 69 IN | WEIGHT: 195 LBS | OXYGEN SATURATION: 98 % | BODY MASS INDEX: 28.88 KG/M2 | DIASTOLIC BLOOD PRESSURE: 69 MMHG | SYSTOLIC BLOOD PRESSURE: 115 MMHG

## 2025-02-12 DIAGNOSIS — E78.5 HYPERLIPIDEMIA, UNSPECIFIED: ICD-10-CM

## 2025-02-12 DIAGNOSIS — K21.00 GASTRO-ESOPHAGEAL REFLUX DISEASE WITH ESOPHAGITIS, WITHOUT BLEEDING: ICD-10-CM

## 2025-02-12 DIAGNOSIS — R00.2 PALPITATIONS: ICD-10-CM

## 2025-02-12 PROCEDURE — 99214 OFFICE O/P EST MOD 30 MIN: CPT

## 2025-02-12 PROCEDURE — 36415 COLL VENOUS BLD VENIPUNCTURE: CPT

## 2025-02-14 LAB
ALBUMIN SERPL ELPH-MCNC: 4.4 G/DL
ALP BLD-CCNC: 78 U/L
ALT SERPL-CCNC: 39 U/L
AST SERPL-CCNC: 32 U/L
BILIRUB DIRECT SERPL-MCNC: 0.3 MG/DL
BILIRUB INDIRECT SERPL-MCNC: 0.9 MG/DL
BILIRUB SERPL-MCNC: 1.2 MG/DL
CHOLEST SERPL-MCNC: 115 MG/DL
HDLC SERPL-MCNC: 57 MG/DL
LDLC SERPL CALC-MCNC: 41 MG/DL
NONHDLC SERPL-MCNC: 58 MG/DL
PROT SERPL-MCNC: 7.1 G/DL
TRIGL SERPL-MCNC: 88 MG/DL

## 2025-02-14 RX ORDER — ROSUVASTATIN CALCIUM 10 MG/1
10 TABLET, FILM COATED ORAL
Qty: 90 | Refills: 3 | Status: ACTIVE | COMMUNITY
Start: 2025-02-12

## 2025-06-03 ENCOUNTER — NON-APPOINTMENT (OUTPATIENT)
Age: 60
End: 2025-06-03

## 2025-06-04 ENCOUNTER — APPOINTMENT (OUTPATIENT)
Dept: INTERNAL MEDICINE | Facility: CLINIC | Age: 60
End: 2025-06-04
Payer: COMMERCIAL

## 2025-06-04 ENCOUNTER — NON-APPOINTMENT (OUTPATIENT)
Age: 60
End: 2025-06-04

## 2025-06-04 VITALS
OXYGEN SATURATION: 98 % | DIASTOLIC BLOOD PRESSURE: 88 MMHG | SYSTOLIC BLOOD PRESSURE: 135 MMHG | HEART RATE: 71 BPM | RESPIRATION RATE: 16 BRPM | HEIGHT: 69 IN | BODY MASS INDEX: 29.25 KG/M2 | WEIGHT: 197.5 LBS

## 2025-06-04 DIAGNOSIS — Z00.00 ENCOUNTER FOR GENERAL ADULT MEDICAL EXAMINATION W/OUT ABNORMAL FINDINGS: ICD-10-CM

## 2025-06-04 PROCEDURE — 93000 ELECTROCARDIOGRAM COMPLETE: CPT

## 2025-06-04 PROCEDURE — 99396 PREV VISIT EST AGE 40-64: CPT

## 2025-06-04 PROCEDURE — 36415 COLL VENOUS BLD VENIPUNCTURE: CPT

## 2025-06-06 LAB
ALBUMIN SERPL ELPH-MCNC: 4.7 G/DL
ALP BLD-CCNC: 86 U/L
ALT SERPL-CCNC: 42 U/L
ANION GAP SERPL CALC-SCNC: 12 MMOL/L
AST SERPL-CCNC: 33 U/L
BILIRUB SERPL-MCNC: 1 MG/DL
BUN SERPL-MCNC: 16 MG/DL
CALCIUM SERPL-MCNC: 10.1 MG/DL
CHLORIDE SERPL-SCNC: 100 MMOL/L
CHOLEST SERPL-MCNC: 123 MG/DL
CO2 SERPL-SCNC: 28 MMOL/L
CREAT SERPL-MCNC: 0.89 MG/DL
EGFRCR SERPLBLD CKD-EPI 2021: 99 ML/MIN/1.73M2
GLUCOSE SERPL-MCNC: 95 MG/DL
HCT VFR BLD CALC: 48.1 %
HDLC SERPL-MCNC: 50 MG/DL
HGB BLD-MCNC: 15.6 G/DL
LDLC SERPL-MCNC: 49 MG/DL
MCHC RBC-ENTMCNC: 28.1 PG
MCHC RBC-ENTMCNC: 32.4 G/DL
MCV RBC AUTO: 86.5 FL
NONHDLC SERPL-MCNC: 72 MG/DL
PLATELET # BLD AUTO: 176 K/UL
POTASSIUM SERPL-SCNC: 4.9 MMOL/L
PROT SERPL-MCNC: 7.4 G/DL
PSA SERPL-MCNC: 0.89 NG/ML
RBC # BLD: 5.56 M/UL
RBC # FLD: 13.1 %
SODIUM SERPL-SCNC: 140 MMOL/L
TRIGL SERPL-MCNC: 137 MG/DL
TSH SERPL-ACNC: 2.97 UIU/ML
URATE SERPL-MCNC: 5 MG/DL
WBC # FLD AUTO: 9.23 K/UL

## 2025-07-02 ENCOUNTER — RX RENEWAL (OUTPATIENT)
Age: 60
End: 2025-07-02

## 2025-09-11 ENCOUNTER — TRANSCRIPTION ENCOUNTER (OUTPATIENT)
Age: 60
End: 2025-09-11

## 2025-09-12 ENCOUNTER — APPOINTMENT (OUTPATIENT)
Dept: OTOLARYNGOLOGY | Facility: CLINIC | Age: 60
End: 2025-09-12

## 2025-09-12 VITALS
DIASTOLIC BLOOD PRESSURE: 86 MMHG | HEIGHT: 69 IN | SYSTOLIC BLOOD PRESSURE: 137 MMHG | OXYGEN SATURATION: 98 % | WEIGHT: 197 LBS | HEART RATE: 71 BPM | BODY MASS INDEX: 29.18 KG/M2 | RESPIRATION RATE: 16 BRPM

## 2025-09-12 DIAGNOSIS — H92.09 OTALGIA, UNSPECIFIED EAR: ICD-10-CM

## 2025-09-12 DIAGNOSIS — M26.609 UNSPECIFIED TEMPOROMANDIBULAR JOINT DISORDER: ICD-10-CM
